# Patient Record
Sex: MALE | Race: WHITE | Employment: FULL TIME | URBAN - METROPOLITAN AREA
[De-identification: names, ages, dates, MRNs, and addresses within clinical notes are randomized per-mention and may not be internally consistent; named-entity substitution may affect disease eponyms.]

---

## 2021-12-15 ENCOUNTER — HOSPITAL ENCOUNTER (EMERGENCY)
Age: 36
Discharge: HOME | End: 2021-12-16
Attending: EMERGENCY MEDICINE
Payer: COMMERCIAL

## 2021-12-15 VITALS
TEMPERATURE: 98.9 F | HEART RATE: 78 BPM | RESPIRATION RATE: 17 BRPM | DIASTOLIC BLOOD PRESSURE: 74 MMHG | SYSTOLIC BLOOD PRESSURE: 121 MMHG

## 2021-12-15 DIAGNOSIS — R55 SYNCOPE AND COLLAPSE: Primary | ICD-10-CM

## 2021-12-15 PROCEDURE — 99283 EMERGENCY DEPT VISIT LOW MDM: CPT

## 2021-12-15 RX ORDER — DEXTROAMPHETAMINE SACCHARATE, AMPHETAMINE ASPARTATE, DEXTROAMPHETAMINE SULFATE AND AMPHETAMINE SULFATE 2.5; 2.5; 2.5; 2.5 MG/1; MG/1; MG/1; MG/1
10 TABLET ORAL 2 TIMES DAILY
COMMUNITY
Start: 2021-12-14

## 2021-12-16 ENCOUNTER — APPOINTMENT (OUTPATIENT)
Dept: GENERAL RADIOLOGY | Age: 36
End: 2021-12-16
Payer: COMMERCIAL

## 2021-12-16 LAB
CHP ED QC CHECK: YES
EKG ATRIAL RATE: 84 BPM
EKG DIAGNOSIS: NORMAL
EKG P AXIS: 63 DEGREES
EKG P-R INTERVAL: 128 MS
EKG Q-T INTERVAL: 376 MS
EKG QRS DURATION: 102 MS
EKG QTC CALCULATION (BAZETT): 444 MS
EKG R AXIS: 63 DEGREES
EKG T AXIS: 44 DEGREES
EKG VENTRICULAR RATE: 84 BPM
GLUCOSE BLD-MCNC: 97 MG/DL
GLUCOSE BLD-MCNC: 97 MG/DL (ref 70–99)
PERFORMED ON: NORMAL

## 2021-12-16 PROCEDURE — 93005 ELECTROCARDIOGRAM TRACING: CPT | Performed by: STUDENT IN AN ORGANIZED HEALTH CARE EDUCATION/TRAINING PROGRAM

## 2021-12-16 PROCEDURE — 73630 X-RAY EXAM OF FOOT: CPT

## 2021-12-16 ASSESSMENT — ENCOUNTER SYMPTOMS
CONSTIPATION: 0
CHEST TIGHTNESS: 0
DIARRHEA: 0
SHORTNESS OF BREATH: 0
WHEEZING: 0
ABDOMINAL PAIN: 0
EYE PAIN: 0
COUGH: 0
NAUSEA: 0
BLOOD IN STOOL: 0
VOMITING: 0

## 2021-12-16 NOTE — ED NOTES
Patient discharged to home via family. Written discharge instructions reviewed with understanding. Copy of AVS sent home with patient. Patient able to walk from ED without assistance.        Alisha Sequeira RN  12/16/21 [id number]

## 2021-12-16 NOTE — ED PROVIDER NOTES
ED Attending Attestation Note     Date of evaluation: 12/15/2021    This patient was seen by the resident. I have seen and examined the patient, agree with the workup, evaluation, management and diagnosis. The care plan has been discussed. I have reviewed the ECG and concur with the resident's interpretation. My assessment reveals a well-appearing 77-year-old male presenting after a witnessed syncopal event at home. He reports he was discharged in the hospital a few days ago after pneumonia, worked a full day, went out to drinks and dinner, and then came home and felt lightheaded. When he stood up to climb the stairs to his bedroom, he passed out. He has since regained consciousness. He has a small frontal cephalhematoma but no underlying crepitus. His GCS of 4, 5, 6. He denies any chest pain currently or prior to the event and feels better with fluid administration. no other external signs of trauma.        Vera Farah MD  12/16/21 [id number]

## 2021-12-16 NOTE — ED NOTES
Bed: A11-11  Expected date:   Expected time:   Means of arrival:   Comments:  Dianne Guadalupe RN  12/15/21 [id number]

## 2021-12-16 NOTE — ED PROVIDER NOTES
[id number] RESIDENT NOTE       Date of evaluation: 12/15/2021    Chief Complaint     Loss of Consciousness      of Present Illness     Lashanda Mullins is a 43 y.o. male who presents to the emergency department for evaluation of syncope and fall. Patient was recently admitted to [facility] for generalized malaise fatigue cough and was diagnosed with atypical pneumonia. He was treated IV antibiotics and finished a course of Levaquin yesterday. Patient reports that he has been feeling better. He went to work today and did not drink much water. Later in the day he felt lightheaded, which was worse when he stood up and he subsequently had a syncopal episode in his bedroom. Patient states he does however remember the entire event. Patient fell down and hit his head on the ground. No nausea vomiting since then. No confusion. Patient states that he feels significantly better at this time. Denies any chest pain or shortness of breath prior to or after the fall. Denies any pain anywhere at this time. Review of Systems     Review of Systems   Constitutional: Negative for appetite change, chills and fever. HENT: Negative for ear pain. Eyes: Negative for pain. Respiratory: Negative for cough, chest tightness, shortness of breath and wheezing. Cardiovascular: Negative for chest pain. Gastrointestinal: Negative for abdominal pain, blood in stool, constipation, diarrhea, nausea and vomiting. Genitourinary: Negative for difficulty urinating, dysuria, flank pain and hematuria. Skin: Negative for rash. Neurological: Positive for syncope. Negative for light-headedness, numbness and headaches. Past Medical, Surgical, Family, and Social History     He has no past medical history on file. He has no past surgical history on file. His family history is not on file. He reports that he has never smoked.  He has never used smokeless tobacco. He reports current alcohol use. He reports that he does not use drugs. Medications     Discharge Medication List as of 12/16/2021  2:33 AM      CONTINUE these medications which have NOT CHANGED    Details   amphetamine-dextroamphetamine (ADDERALL) 10 MG tablet Take 10 mg by mouth 2 times daily. Historical Med             Allergies     He has No Known Allergies. Physical Exam     INITIAL VITALS: BP: 121/74, Temp: 98.9 °F (37.2 °C), Pulse: 78, Resp: 17,     Physical Exam  Vitals and nursing note reviewed. Constitutional:       General: He is not in acute distress. Appearance: Normal appearance. He is not ill-appearing, toxic-appearing or diaphoretic. HENT:      Head: Normocephalic. Comments: Small hematoma over right forehead but no other evidence of head/face trauma. No septal hematoma. No blood in the nose or mouth. Nose: Nose normal.   Eyes:      Pupils: Pupils are equal, round, and reactive to light. Cardiovascular:      Rate and Rhythm: Normal rate and regular rhythm. Pulses: Normal pulses. Heart sounds: Normal heart sounds. No murmur heard. No friction rub. No gallop. Pulmonary:      Effort: Pulmonary effort is normal. No respiratory distress. Breath sounds: Normal breath sounds. No stridor. No wheezing, rhonchi or rales. Chest:      Chest wall: No tenderness. Abdominal:      General: There is no distension. Palpations: Abdomen is soft. There is no mass. Tenderness: There is no abdominal tenderness. There is no guarding or rebound. Hernia: No hernia is present. Musculoskeletal:      Comments: No C/T/L spine tenderness. No tenderness over extremities. Skin:     General: Skin is warm and dry. Neurological:      Mental Status: He is alert and oriented to person, place, and time. Mental status is at baseline.        RECENT VITALS:  BP: 121/74, Temp: 98.9 °F (37.2 °C), Pulse: 78,Resp: 17,       DiagnosticResults     EKG   Interpreted in conjunction with emergencydepartment physician   Rhythm: normal sinus   Rate: normal  Axis: normal  Ectopy: none  Conduction: normal  ST Segments: no acute change  T Waves:no acute change  Q Waves: none  Clinical Impression: no acute findings    RADIOLOGY:  XR FOOT RIGHT (MIN 3 VIEWS)   Final Result      Normal.          LABS:   Results for orders placed or performed during the hospital encounter of 12/15/21   POCT Glucose   Result Value Ref Range    Glucose 97 mg/dL    QC OK? yes    POCT Glucose   Result Value Ref Range    POC Glucose 97 70 - 99 mg/dl    Performed on ACCU-CHEK        ED Course     Nursing Notes, Past Medical Hx, Past Surgical Hx, Social Hx, Allergies, and Family Hx were reviewed. The patient was given the followingmedications:  No orders of the defined types were placed in this encounter. CONSULTS:  None    MEDICAL DECISION MAKING / ASSESSMENT / Aurelio Zurita is a 43 y.o. male w/ no pmhx here after a pre/syncope event and fall. Pt had flu shot today, little PO intake, recent illness, all likely etiology of his symptoms. EKG w/ no evidence of ischemia or arrhythmia to explain his symptoms. BG wnl. Pt now back to baseline w/ no symptoms to suggest alternative etiology. He had a ground level fall, w/ no concerning symptoms otherwise and a reassuring exam, per [place] CT rules does not warrant CTH/c spine imaging. Full exam only notable for tenderness over dorsum of right foot, XR w/ no bony abnormality. Pt able to PO in ED w/ no issues. Discussed mild dehydration as likely etiology of sx, given pt is able to PO and asymptomatic, I feel he is safe for discharged. Advised continued symptomatic care and follow up with PCP. This patient was also evaluated by the attending physician. All care plans werediscussed and agreed upon. Clinical Impression     1. Syncope and collapse        Disposition     PATIENT REFERRED TO:  Sukhdeep Crow MD  [address] [facility].   [address] [facility] [address]  [phone]    Schedule an appointment as soon as possible for a visit         DISCHARGE MEDICATIONS:  Discharge Medication List as of 12/16/2021  2:33 AM          DISPOSITION Decision To Discharge 12/16/2021 01:39:25 AM       Zacarias Espinoza MD  Resident  12/16/21 [id number]